# Patient Record
Sex: FEMALE | URBAN - METROPOLITAN AREA
[De-identification: names, ages, dates, MRNs, and addresses within clinical notes are randomized per-mention and may not be internally consistent; named-entity substitution may affect disease eponyms.]

---

## 2024-05-13 ENCOUNTER — OFFICE VISIT (OUTPATIENT)
Dept: NEUROSURGERY | Facility: CLINIC | Age: 74
End: 2024-05-13
Payer: MEDICARE

## 2024-05-13 VITALS
BODY MASS INDEX: 31.89 KG/M2 | HEART RATE: 76 BPM | WEIGHT: 180 LBS | SYSTOLIC BLOOD PRESSURE: 138 MMHG | RESPIRATION RATE: 18 BRPM | DIASTOLIC BLOOD PRESSURE: 68 MMHG | HEIGHT: 63 IN

## 2024-05-13 DIAGNOSIS — Z98.1 S/P LUMBAR SPINAL FUSION: Primary | ICD-10-CM

## 2024-05-13 PROCEDURE — 1036F TOBACCO NON-USER: CPT | Performed by: NEUROLOGICAL SURGERY

## 2024-05-13 PROCEDURE — 99202 OFFICE O/P NEW SF 15 MIN: CPT | Performed by: NEUROLOGICAL SURGERY

## 2024-05-13 PROCEDURE — 1125F AMNT PAIN NOTED PAIN PRSNT: CPT | Performed by: NEUROLOGICAL SURGERY

## 2024-05-13 PROCEDURE — 1159F MED LIST DOCD IN RCRD: CPT | Performed by: NEUROLOGICAL SURGERY

## 2024-05-13 RX ORDER — LORAZEPAM 1 MG/1
1 TABLET ORAL 2 TIMES DAILY PRN
COMMUNITY

## 2024-05-13 RX ORDER — PANTOPRAZOLE SODIUM 40 MG/1
1 TABLET, DELAYED RELEASE ORAL DAILY
COMMUNITY

## 2024-05-13 RX ORDER — SERTRALINE HYDROCHLORIDE 100 MG/1
TABLET, FILM COATED ORAL
COMMUNITY

## 2024-05-13 RX ORDER — METFORMIN HYDROCHLORIDE 500 MG/1
1 TABLET ORAL
COMMUNITY

## 2024-05-13 RX ORDER — ROSUVASTATIN CALCIUM 40 MG/1
1 TABLET, COATED ORAL DAILY
COMMUNITY

## 2024-05-13 RX ORDER — KETOCONAZOLE 20 MG/G
CREAM TOPICAL
COMMUNITY

## 2024-05-13 RX ORDER — ACETAMINOPHEN 500 MG
TABLET ORAL
COMMUNITY

## 2024-05-13 RX ORDER — DULAGLUTIDE 4.5 MG/.5ML
INJECTION, SOLUTION SUBCUTANEOUS
COMMUNITY

## 2024-05-13 RX ORDER — IPRATROPIUM BROMIDE AND ALBUTEROL SULFATE 2.5; .5 MG/3ML; MG/3ML
SOLUTION RESPIRATORY (INHALATION)
COMMUNITY

## 2024-05-13 RX ORDER — DICLOFENAC SODIUM 50 MG/1
TABLET, DELAYED RELEASE ORAL
COMMUNITY

## 2024-05-13 RX ORDER — LISINOPRIL 40 MG/1
1 TABLET ORAL DAILY
COMMUNITY

## 2024-05-13 RX ORDER — IBUPROFEN 800 MG/1
TABLET ORAL
COMMUNITY
Start: 2023-08-25

## 2024-05-13 ASSESSMENT — ENCOUNTER SYMPTOMS
WEAKNESS: 1
ENDOCRINE NEGATIVE: 1
NECK PAIN: 1
BRUISES/BLEEDS EASILY: 1
ALLERGIC/IMMUNOLOGIC NEGATIVE: 1
OCCASIONAL FEELINGS OF UNSTEADINESS: 1
PALPITATIONS: 1
PSYCHIATRIC NEGATIVE: 1
FATIGUE: 1
SHORTNESS OF BREATH: 1
LOSS OF SENSATION IN FEET: 0
GASTROINTESTINAL NEGATIVE: 1
BACK PAIN: 1
DEPRESSION: 0

## 2024-05-13 ASSESSMENT — PAIN SCALES - GENERAL: PAINLEVEL: 10-WORST PAIN EVER

## 2024-05-13 NOTE — PROGRESS NOTES
"History of lumbar surgery. Started about 9 months ago with ache, stabbing and burning pain in back and down the left leg with weakness in the left leg. Has had PT and injections.    73-year-old presents for evaluation of severe low back pain.  The patient underwent a lumbar decompression and fusion with interbody graft for axial back pain in 2023.  Initially, the pain somewhat improved.  Now it is back.  She is also having pain running down her left leg.  Is progressed to the point where she is not ambulating with a walker.    Review of Systems   Constitutional:  Positive for fatigue.   HENT:  Positive for hearing loss.    Eyes:  Positive for visual disturbance.   Respiratory:  Positive for shortness of breath.    Cardiovascular:  Positive for palpitations.   Gastrointestinal: Negative.    Endocrine: Negative.    Genitourinary: Negative.    Musculoskeletal:  Positive for back pain and neck pain.   Skin: Negative.    Allergic/Immunologic: Negative.    Neurological:  Positive for weakness.   Hematological:  Bruises/bleeds easily.   Psychiatric/Behavioral: Negative.         Visit Vitals  /68   Pulse 76   Resp 18   Ht 1.588 m (5' 2.5\")   Wt 81.6 kg (180 lb)   BMI 32.40 kg/m²   Smoking Status Former   BSA 1.9 m²           Current Outpatient Medications:     ibuprofen 800 mg tablet, , Disp: , Rfl:     cholecalciferol (Vitamin D-3) 50 mcg (2,000 unit) capsule, Take by mouth once daily., Disp: , Rfl:     diclofenac (Voltaren) 50 mg EC tablet, TAKE ONE TABLET BY MOUTH THREE TIMES A DAY AS NEEDED WITH FOOD, Disp: , Rfl:     dulaglutide (Trulicity) 4.5 mg/0.5 mL pen injector, INJECT ONE PEN SUBCUTANEOUSLY ONCE A WEEK, Disp: , Rfl:     ipratropium-albuteroL (Duo-Neb) 0.5-2.5 mg/3 mL nebulizer solution, INHALE ONE VIAL VIA NEBULIZER FOUR TIMES A DAY, Disp: , Rfl:     ketoconazole (NIZOral) 2 % cream, APPLY TOPICALLY TO FEET TWICE DAILY, Disp: , Rfl:     lisinopril 40 mg tablet, Take 1 tablet (40 mg) by mouth once daily., " Disp: , Rfl:     LORazepam (Ativan) 1 mg tablet, Take 1 tablet (1 mg) by mouth 2 times a day as needed., Disp: , Rfl:     metFORMIN (Glucophage) 500 mg tablet, Take 1 tablet (500 mg) by mouth 2 times a day with meals., Disp: , Rfl:     pantoprazole (ProtoNix) 40 mg EC tablet, Take 1 tablet (40 mg) by mouth once daily., Disp: , Rfl:     rosuvastatin (Crestor) 40 mg tablet, Take 1 tablet (40 mg) by mouth once daily., Disp: , Rfl:     sertraline (Zoloft) 100 mg tablet, TAKE TWO TABLETS BY MOUTH ONE TIME DAILY, Disp: , Rfl:       Objective   Neurological Exam    On physical exam, the patient is alert and interactive.  Formal strength testing is difficult because of her level of pain however she moves both legs symmetrically.    The patient had a recurrence of her axial back pain that has radiculopathy.  I would like to obtain a CAT scan of the lumbar spine without contrast to evaluate her instrumentation and alignment and look for any evidence of nerve root compression before making any decisions about whether or not she would benefit from any surgical intervention.

## 2024-05-16 ENCOUNTER — HOSPITAL ENCOUNTER (OUTPATIENT)
Dept: RADIOLOGY | Facility: CLINIC | Age: 74
Discharge: HOME | End: 2024-05-16
Payer: MEDICARE

## 2024-05-16 ENCOUNTER — APPOINTMENT (OUTPATIENT)
Dept: RADIOLOGY | Facility: CLINIC | Age: 74
End: 2024-05-16
Payer: MEDICARE

## 2024-05-16 ENCOUNTER — HOSPITAL ENCOUNTER (OUTPATIENT)
Dept: RADIOLOGY | Facility: CLINIC | Age: 74
End: 2024-05-16
Payer: MEDICARE

## 2024-05-16 DIAGNOSIS — Z98.1 S/P LUMBAR SPINAL FUSION: ICD-10-CM

## 2024-05-16 PROCEDURE — 72131 CT LUMBAR SPINE W/O DYE: CPT

## 2024-05-16 PROCEDURE — 72131 CT LUMBAR SPINE W/O DYE: CPT | Performed by: RADIOLOGY

## 2024-05-29 DIAGNOSIS — Z98.1 S/P LUMBAR SPINAL FUSION: Primary | ICD-10-CM

## 2024-06-18 NOTE — PROGRESS NOTES
[facility]   Neurosurgery    Diagnosis  Diagnoses and all orders for this visit:  Postlaminectomy syndrome of lumbar region  S/P lumbar spinal fusion  -     Referral to Neurosurgery      Patient Discussion/Summary  Maine has intractable postlaminectomy syndrome.  She has failed multimodal management with physical therapy, injections and medications.    Despite the fact that she had a previous spinal cord stimulation trial, we believe this was likely done too early following spine surgery, as typically we would not perform a trial within 6 to 12 months.  It also seems that she only was programmed with a tonic waveform, resulting in uncomfortable paresthesias.  We believe that she would be a much better trial candidate at this juncture, having been more than a year out from surgery and using subperception technology.    As such, we proposed a spinal cord stimulation trial in detail.  This is an externalized trial lasting approximately 1 week.  She will need to achieve a pain improvement threshold of at least 50% before proceeding with permanent implantation.    The procedure was discussed in detail and all of their questions were answered.  We also discussed the risks of the procedure including, but not limited to the risks of bleeding, infection, CSF leak, hardware malfunction, and neurological injury including, but not limited to increased pain, numbness, weakness, paralysis or stroke.  The risks of an anesthetic including cardiorespiratory compromise, coma or death were touched upon, and will be discussed in greater detail by the anesthesiologist.    Provider Impressions  Maine has a history of low back pain and lumbar radiculopathy.  Due to intractable pain, she underwent L4-L5 decompression and instrumented fusion in May 2023.  Unfortunately, this did not relieve her pain, and instead exacerbated it.  She also developed new pains following the surgery.    She currently describes chronic and  "intractable low back pain.  This is present regardless of position, but is certainly worse when she is up and ambulating.  She is nearly bedbound at this point, and cannot get up and do many activities of daily living.  She also has profound leg pain, which she describes as aching, twisting and like a \"rubber band\".  She feels this tightness around her waist into her low back which then radiates into her legs.  She also feels weak in her legs.    She has had subsequent imaging which has demonstrated appropriate position of the hardware with no further adverse changes or pathology that could be causing her symptoms.    She has seen pain physicians at length.  She has had epidural steroid injections without improvement.  She has tried physical therapy.  She takes medication with only mild blunting of the pain.    Apparently, she underwent a trial of spinal cord stimulation less than 6 months following her surgery.  She did not like the feeling of tonic stimulation and was trying to increase the stimulation amplitude.  It does not seem that she had any subperception programming, as she recalls a constant sense of tingling.    She recently established care with Dr. Sanders, who reviewed her imaging and history and did not feel that she was an operative candidate.  He referred her for additional consideration of neuromodulation.    History of Present Illness  Chief Complaint: No chief complaint on file.        HPI: Maine Noel is a 73 y.o. female referred by Dr. Sanders with low back pain. The patient underwent a lumbar decompression and fusion with interbody graft for axial back pain in 2023.  Initially, the pain somewhat improved but the pain has now returned. The pain started about 10 months ago with aches, stabbing and burning pain in back and down the left leg with weakness in the left leg. Has had PT and injections with very little improvement. Patient presents today to discuss spinal cord " "stimulation.      ROS: A complete 11 system ROS was performed (constitutional, eyes, ENT, cardiovascular, respiratory, GI, , musculoskeletal, skin, neurological, and psychiatric) and was negative aside from the pertinent positives and negatives noted in the HPI.      Previous History  Past Medical History:   Diagnosis Date    Diabetes (Multi)     HTN (hypertension)     Lung disease      Past Surgical History:   Procedure Laterality Date    LUMBAR FUSION       Social History     Tobacco Use    Smoking status: Former     Types: Cigarettes    Smokeless tobacco: Never   Substance Use Topics    Alcohol use: Never    Drug use: Never     Family History   Problem Relation Name Age of Onset    Skin cancer Mother      Stroke Mother      Heart disease Father      Other (htn) Father      Hyperlipidemia Father      Stroke Father       Allergies   Allergen Reactions    Opioids - Morphine Analogues Anaphylaxis, GI Upset, Nausea Only, Respiratory depression, Unknown and Nausea/vomiting     \"drunk like\"    All opioids    Phenothiazines Anaphylaxis and Unknown    Erythromycin Other    Fluticasone Propionate Other     hoarseness    Thimerosal Other     unknown     Current Outpatient Medications   Medication Instructions    cholecalciferol (Vitamin D-3) 50 mcg (2,000 unit) capsule oral, Daily RT    diclofenac (Voltaren) 50 mg EC tablet TAKE ONE TABLET BY MOUTH THREE TIMES A DAY AS NEEDED WITH FOOD    dulaglutide (Trulicity) 4.5 mg/0.5 mL pen injector INJECT ONE PEN SUBCUTANEOUSLY ONCE A WEEK    ibuprofen 800 mg tablet     ipratropium-albuteroL (Duo-Neb) 0.5-2.5 mg/3 mL nebulizer solution INHALE ONE VIAL VIA NEBULIZER FOUR TIMES A DAY    ketoconazole (NIZOral) 2 % cream APPLY TOPICALLY TO FEET TWICE DAILY    lisinopril 40 mg tablet 1 tablet, oral, Daily    LORazepam (Ativan) 1 mg tablet 1 tablet, oral, 2 times daily PRN    metFORMIN (Glucophage) 500 mg tablet 1 tablet, oral, 2 times daily (morning and late afternoon)    pantoprazole " (ProtoNix) 40 mg EC tablet 1 tablet, oral, Daily    rosuvastatin (Crestor) 40 mg tablet 1 tablet, oral, Daily    sertraline (Zoloft) 100 mg tablet TAKE TWO TABLETS BY MOUTH ONE TIME DAILY         Vitals  There were no vitals taken for this visit.    Results  A CT scan of the lumbar spine was reviewed.  This demonstrates posterolateral fusion and interbody hardware at L4-L5.  There is no evidence of hardware failure.  There is a compression deformity at L1, with minimal retropulsion.  No other adverse findings are identified.

## 2024-06-19 ENCOUNTER — TELEMEDICINE (OUTPATIENT)
Dept: NEUROSURGERY | Facility: HOSPITAL | Age: 74
End: 2024-06-19
Payer: MEDICARE

## 2024-06-19 DIAGNOSIS — Z98.1 S/P LUMBAR SPINAL FUSION: ICD-10-CM

## 2024-06-19 DIAGNOSIS — M96.1 POSTLAMINECTOMY SYNDROME OF LUMBAR REGION: Primary | ICD-10-CM

## 2024-06-19 PROCEDURE — 99213 OFFICE O/P EST LOW 20 MIN: CPT | Mod: 95 | Performed by: NEUROLOGICAL SURGERY

## 2024-06-19 PROCEDURE — 99213 OFFICE O/P EST LOW 20 MIN: CPT | Performed by: NEUROLOGICAL SURGERY

## 2024-07-17 ENCOUNTER — APPOINTMENT (OUTPATIENT)
Dept: NEUROSURGERY | Facility: HOSPITAL | Age: 74
End: 2024-07-17
Payer: MEDICARE